# Patient Record
Sex: MALE | Race: WHITE | NOT HISPANIC OR LATINO | Employment: OTHER | URBAN - METROPOLITAN AREA
[De-identification: names, ages, dates, MRNs, and addresses within clinical notes are randomized per-mention and may not be internally consistent; named-entity substitution may affect disease eponyms.]

---

## 2017-12-04 ENCOUNTER — ALLSCRIPTS OFFICE VISIT (OUTPATIENT)
Dept: OTHER | Facility: OTHER | Age: 82
End: 2017-12-04

## 2017-12-04 LAB
BILIRUB UR QL STRIP: NEGATIVE
CLARITY UR: NORMAL
COLOR UR: YELLOW
GLUCOSE (HISTORICAL): NEGATIVE
HGB UR QL STRIP.AUTO: NEGATIVE
KETONES UR STRIP-MCNC: NEGATIVE MG/DL
LEUKOCYTE ESTERASE UR QL STRIP: NORMAL
NITRITE UR QL STRIP: NEGATIVE
PH UR STRIP.AUTO: 6 [PH]
PROT UR STRIP-MCNC: NORMAL MG/DL
SP GR UR STRIP.AUTO: 1.01
UROBILINOGEN UR QL STRIP.AUTO: 0.2

## 2018-01-16 ENCOUNTER — GENERIC CONVERSION - ENCOUNTER (OUTPATIENT)
Dept: OTHER | Facility: OTHER | Age: 83
End: 2018-01-16

## 2018-01-23 VITALS
HEIGHT: 64 IN | WEIGHT: 153 LBS | BODY MASS INDEX: 26.12 KG/M2 | SYSTOLIC BLOOD PRESSURE: 138 MMHG | DIASTOLIC BLOOD PRESSURE: 86 MMHG

## 2018-01-23 NOTE — MISCELLANEOUS
Message     Recorded as Task   Date: 01/15/2018 02:00 PM, Created By: Dior Vora   Task Name: Call Back   Assigned To: James SHAH,TEAM   Regarding Patient: Kris Tobin, Status: In Progress   Comment:    Reyes,Jennifer - [address] 2018 2:00 PM     TASK CREATED  Pateint called was seen by Dr Linda Fried [phone] wanted to know if Dr Sabina Vora wanted to see him as well  If you have any questions call lore Velazquez [phone] 2018 2:15 PM     TASK EDITED  LMOM FOR PT TO CALL  NEED TO ASSESS IF PT NEEDS APPT WITH Amber Keith - [address] 2018 2:15 PM     TASK IN PROGRESS   Mandy Garcia - 16 Jan 2018 2:12 PM     TASK EDITED  LMOM TO GIVE A CALL BACK, NEED MORE INFO ON WHY PT  WENT TO SEE Mandy Hodges - 16 Jan 2018 3:49 PM     TASK EDITED  SPOKE WITH PT  HE JUST WANTS TO SEND A FYI THAT HE WAS IN [facility] [place] [address] WITH LED POISONING  HE WILL KEEP HIS FU APPT  Current Meds   1  Aggrenox  MG Oral Capsule Extended Release 12 Hour (Aspirin-Dipyridamole   ER); Therapy: (Recorded:30Nov2017) to Recorded   2  BenzaClin 1-5 % External Gel (Clindamycin Phos-Benzoyl Perox); Therapy: (Recorded:30Nov2017) to Recorded   3  Diovan 160 MG Oral Tablet (Valsartan); Therapy: (Recorded:30Nov2017) to Recorded   4  Lutein 10 MG Oral Tablet; Therapy: (Recorded:30Nov2017) to Recorded   5  Tamsulosin HCl - 0 4 MG Oral Capsule; TAKE 1 CAPSULE Bedtime; Therapy: [id number] to (Evaluate:[id number])  Requested for: 40Cgt0685; Last   Rx:[id number] Ordered    Allergies    1  Penicillins   2  Tetracyclines   3  Versed SOLN   4  Zithromax TABS    5  No Known Environmental Allergies   6   No Known Food Allergies    Signatures   Electronically signed by : Carmine Gastelum, ; Jan 16 2018  3:50PM EST                       (Author)

## 2018-04-27 DIAGNOSIS — N40.1 BPH WITH OBSTRUCTION/LOWER URINARY TRACT SYMPTOMS: Primary | ICD-10-CM

## 2018-04-27 DIAGNOSIS — N13.8 BPH WITH OBSTRUCTION/LOWER URINARY TRACT SYMPTOMS: Primary | ICD-10-CM

## 2018-04-27 RX ORDER — TAMSULOSIN HYDROCHLORIDE 0.4 MG/1
CAPSULE ORAL
Qty: 90 CAPSULE | Refills: 1 | Status: SHIPPED | OUTPATIENT
Start: 2018-04-27 | End: 2019-01-24

## 2018-12-04 ENCOUNTER — PROCEDURE VISIT (OUTPATIENT)
Dept: UROLOGY | Facility: MEDICAL CENTER | Age: 83
End: 2018-12-04
Payer: COMMERCIAL

## 2018-12-04 VITALS
WEIGHT: 159 LBS | BODY MASS INDEX: 29.26 KG/M2 | HEART RATE: 85 BPM | DIASTOLIC BLOOD PRESSURE: 78 MMHG | SYSTOLIC BLOOD PRESSURE: 142 MMHG | HEIGHT: 62 IN

## 2018-12-04 DIAGNOSIS — N40.0 BPH WITHOUT OBSTRUCTION/LOWER URINARY TRACT SYMPTOMS: ICD-10-CM

## 2018-12-04 DIAGNOSIS — Z85.51 HISTORY OF BLADDER CANCER: Primary | ICD-10-CM

## 2018-12-04 PROCEDURE — 52000 CYSTOURETHROSCOPY: CPT | Performed by: UROLOGY

## 2018-12-04 PROCEDURE — 99213 OFFICE O/P EST LOW 20 MIN: CPT | Performed by: UROLOGY

## 2018-12-04 NOTE — PROGRESS NOTES
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  That was his only tumor  He has had no signs or symptoms of recurrent neoplasm  Procedures  MALE FLEXIBLE CYSTOSCOPY    Preoperative diagnosis:   History of bladder cancer    Postoperative diagnosis:   No visible recurrence    Operation:  Flexible cystoscopy    Surgeon:  Manoj Gonzalez MD,FACS    Anesthesia:  Xylocaine jelly    Procedure:  Patient was brought to the cystoscopy room and positively identified  The patient was prepped and draped in sterile fashion  Ten mL of 1% xylocaine jelly was instilled into the urethra  A penile clamp was applied  The flexible cystoscope was inserted  Findings are as follows:    Penile Urethra:normal  Bulbar Urethra:normal  Prostate:   Lateral lobe hyperplasia for approximately 2 cm  Bladder: The bladder neck is normal  Ureteral orifices are in normal  position  The mucosa is   Normal    There is  marked trabeculation  The cystoscope was removed  The patient tolerated the procedure well  Following additional consultation to review the findings with the patient, he was discharged from the office in satisfactory condition  Impression:  No recurrent bladder cancer  BPH without symptoms  BPH:   He notes nocturia x 2  He denies other significant urinary symptoms  He denies gross hematuria, urinary tract infections or incontinence    He is taking neither medications nor supplements for his symptoms  AUA score was 10 last year, now down to 2  PSA's are no longer monitored due to the patient's age  AUA SYMPTOM SCORE      Most Recent Value   AUA SYMPTOM SCORE   How often have you had a sensation of not emptying your bladder completely after you finished urinating? 0   How often have you had to urinate again less than two hours after you finished urinating? 0   How often have you found you stopped and started again several times when you urinate?  0   How often have you found it difficult to postpone urination? 0   How often have you had a weak urinary stream?  0   How often have you had to push or strain to begin urination? 0   How many times did you most typically get up to urinate from the time you went to bed at night until the time you got up in the morning? 2   Quality of Life: If you were to spend the rest of your life with your urinary condition just the way it is now, how would you feel about that?  1   AUA SYMPTOM SCORE  2          The following portions of the patient's history were reviewed and updated as appropriate: allergies, current medications, past family history, past medical history, past social history, past surgical history and problem list     Review of Systems   Constitutional: Negative for activity change and fatigue  Respiratory: Negative for shortness of breath and wheezing  Cardiovascular: Negative for chest pain  Hypertension  Gastrointestinal: Negative for abdominal pain  Genitourinary: Negative for difficulty urinating, dysuria, frequency, hematuria and urgency  Musculoskeletal: Negative for back pain and gait problem  Diffuse arthritis  Skin: Negative  Allergic/Immunologic: Negative  Neurological: Negative  Psychiatric/Behavioral: Negative            Objective:      /78 (BP Location: Left arm, Patient Position: Sitting, Cuff Size: Adult)   Pulse 85   Ht 5' 1 5" (1 562 m)   Wt 72 1 kg (159 lb)   BMI 29 56 kg/m²          Physical Exam   Constitutional: He is oriented to person, place, and time  He appears well-developed and well-nourished  HENT:   Head: Normocephalic and atraumatic  Eyes: EOM are normal    Neck: Normal range of motion  Neck supple  Pulmonary/Chest: Effort normal    Genitourinary: Rectum normal    Genitourinary Comments: The prostate is 40 grams, firm, smooth and non-tender   Normal external genitalia  Musculoskeletal: Normal range of motion  Neurological: He is alert and oriented to person, place, and time  Skin: Skin is warm and dry  Psychiatric: He has a normal mood and affect   His behavior is normal  Judgment and thought content normal

## 2018-12-04 NOTE — LETTER
December 4, 2018     Sweetie Toro, [address]    Patient: Joseph Nelson   YOB: 1926   Date of Visit: 12/4/2018       Dear Dr Aletha Gonzalez: Thank you for referring Verenice Areli to me for evaluation  Below are my notes for this consultation  If you have questions, please do not hesitate to call me  I look forward to following your patient along with you  Sincerely,        Darwin Gonzalez MD        CC: No Recipients  Darwin Gonzalez MD  12/4/2018 11:01 AM  Sign at close encounter  Assessment/Plan:    History of bladder cancer   No evidence of recurrence 8 years following TUR bladder tumor for superficial low-grade disease  BPH without obstruction/lower urinary tract symptoms    Minimally symptomatic  Reassess in 1 year  Diagnoses and all orders for this visit:    History of bladder cancer    BPH without obstruction/lower urinary tract symptoms          Subjective:      Patient ID: Joseph Nelson is a 80 y o  male  HPI  History of bladder cancer: The patient's last tumor was a low-grade transitional cell carcinoma resected 8 years ago  That was his only tumor  He has had no signs or symptoms of recurrent neoplasm  Procedures  MALE FLEXIBLE CYSTOSCOPY    Preoperative diagnosis:   History of bladder cancer    Postoperative diagnosis:   No visible recurrence    Operation:  Flexible cystoscopy    Surgeon:  Khang Badillo MD,FACS    Anesthesia:  Xylocaine jelly    Procedure:  Patient was brought to the cystoscopy room and positively identified  The patient was prepped and draped in sterile fashion  Ten mL of 1% xylocaine jelly was instilled into the urethra  A penile clamp was applied  The flexible cystoscope was inserted  Findings are as follows:    Penile Urethra:normal  Bulbar Urethra:normal  Prostate:   Lateral lobe hyperplasia for approximately 2 cm  Bladder:    The bladder neck is normal  Ureteral orifices are in normal  position  The mucosa is   Normal    There is  marked trabeculation  The cystoscope was removed  The patient tolerated the procedure well  Following additional consultation to review the findings with the patient, he was discharged from the office in satisfactory condition  Impression:  No recurrent bladder cancer  BPH without symptoms  BPH:   He notes nocturia x 2  He denies other significant urinary symptoms  He denies gross hematuria, urinary tract infections or incontinence  He is taking neither medications nor supplements for his symptoms  AUA score was 10 last year, now down to 2  PSA's are no longer monitored due to the patient's age  AUA SYMPTOM SCORE      Most Recent Value   AUA SYMPTOM SCORE   How often have you had a sensation of not emptying your bladder completely after you finished urinating? 0   How often have you had to urinate again less than two hours after you finished urinating? 0   How often have you found you stopped and started again several times when you urinate?  0   How often have you found it difficult to postpone urination? 0   How often have you had a weak urinary stream?  0   How often have you had to push or strain to begin urination? 0   How many times did you most typically get up to urinate from the time you went to bed at night until the time you got up in the morning? 2   Quality of Life: If you were to spend the rest of your life with your urinary condition just the way it is now, how would you feel about that?  1   AUA SYMPTOM SCORE  2          The following portions of the patient's history were reviewed and updated as appropriate: allergies, current medications, past family history, past medical history, past social history, past surgical history and problem list     Review of Systems   Constitutional: Negative for activity change and fatigue  Respiratory: Negative for shortness of breath and wheezing      Cardiovascular: Negative for chest pain  Hypertension  Gastrointestinal: Negative for abdominal pain  Genitourinary: Negative for difficulty urinating, dysuria, frequency, hematuria and urgency  Musculoskeletal: Negative for back pain and gait problem  Diffuse arthritis  Skin: Negative  Allergic/Immunologic: Negative  Neurological: Negative  Psychiatric/Behavioral: Negative  Objective:      /78 (BP Location: Left arm, Patient Position: Sitting, Cuff Size: Adult)   Pulse 85   Ht 5' 1 5" (1 562 m)   Wt 72 1 kg (159 lb)   BMI 29 56 kg/m²           Physical Exam   Constitutional: He is oriented to person, place, and time  He appears well-developed and well-nourished  HENT:   Head: Normocephalic and atraumatic  Eyes: EOM are normal    Neck: Normal range of motion  Neck supple  Pulmonary/Chest: Effort normal    Genitourinary: Rectum normal    Genitourinary Comments: The prostate is 40 grams, firm, smooth and non-tender   Normal external genitalia  Musculoskeletal: Normal range of motion  Neurological: He is alert and oriented to person, place, and time  Skin: Skin is warm and dry  Psychiatric: He has a normal mood and affect   His behavior is normal  Judgment and thought content normal

## 2019-01-23 RX ORDER — METOPROLOL SUCCINATE 25 MG/1
25 TABLET, EXTENDED RELEASE ORAL
COMMUNITY
Start: 2019-01-05

## 2019-01-23 RX ORDER — SERTRALINE HYDROCHLORIDE 25 MG/1
25 TABLET, FILM COATED ORAL
COMMUNITY
Start: 2019-01-15 | End: 2019-04-15

## 2019-01-23 RX ORDER — VALSARTAN 80 MG/1
80 TABLET ORAL
COMMUNITY
Start: 2019-01-05

## 2019-01-24 ENCOUNTER — OFFICE VISIT (OUTPATIENT)
Dept: UROLOGY | Facility: MEDICAL CENTER | Age: 84
End: 2019-01-24
Payer: COMMERCIAL

## 2019-01-24 VITALS
SYSTOLIC BLOOD PRESSURE: 122 MMHG | DIASTOLIC BLOOD PRESSURE: 68 MMHG | WEIGHT: 151 LBS | HEIGHT: 65 IN | BODY MASS INDEX: 25.16 KG/M2 | HEART RATE: 70 BPM

## 2019-01-24 DIAGNOSIS — N30.00 ACUTE CYSTITIS WITHOUT HEMATURIA: Primary | ICD-10-CM

## 2019-01-24 DIAGNOSIS — N13.8 BPH WITH OBSTRUCTION/LOWER URINARY TRACT SYMPTOMS: ICD-10-CM

## 2019-01-24 DIAGNOSIS — N40.1 BPH WITH OBSTRUCTION/LOWER URINARY TRACT SYMPTOMS: ICD-10-CM

## 2019-01-24 DIAGNOSIS — Z71.89 OTHER SPECIFIED COUNSELING: ICD-10-CM

## 2019-01-24 PROCEDURE — 99214 OFFICE O/P EST MOD 30 MIN: CPT | Performed by: UROLOGY

## 2019-01-24 RX ORDER — TAMSULOSIN HYDROCHLORIDE 0.4 MG/1
0.4 CAPSULE ORAL EVERY EVENING
Qty: 90 CAPSULE | Refills: 3 | Status: SHIPPED | OUTPATIENT
Start: 2019-01-24

## 2019-01-24 NOTE — PROGRESS NOTES
Assessment/Plan:    BPH with obstruction/lower urinary tract symptoms  Tamsulosin renewed for 1 year  Acute cystitis without hematuria  The patient has no outward signs of urinary infection or sepsis  A urine culture will be obtained  Diagnoses and all orders for this visit:    Acute cystitis without hematuria  -     Urine culture; Future    BPH with obstruction/lower urinary tract symptoms  -     tamsulosin (FLOMAX) 0 4 mg; Take 1 capsule (0 4 mg total) by mouth every evening    Other specified counseling    Other orders  -     cyanocobalamin 100 MCG tablet; Take 1,000 mcg by mouth  -     metoprolol succinate (TOPROL-XL) 25 mg 24 hr tablet; Take 25 mg by mouth  -     sertraline (ZOLOFT) 25 mg tablet; Take 25 mg by mouth  -     valsartan (DIOVAN) 80 mg tablet; Take 80 mg by mouth          Subjective:      Patient ID: Adolfo Mata is a 80 y o  male  HPI  Urinary tract infection with sepsis:  The patient underwent a surveillance cystoscopy for bladder cancer on December 4, 2018  Urinalysis in the office prior his cystoscopy was normal   Within 24 hr, he was admitted to [facility] with urinary sepsis due to Klebsiella oxytocin and enterococcus faecalis  Both organisms were recovered from blood  He was treated with antibiotics appropriately  He was discharged to rehab to get his strength back  Eventually, he went  he developed symptoms of urinary sepsis once again on January 1, 2019  A urine culture then grew Klebsiella oxytocin of once again  Blood cultures were negative  A noncontrast CT-stone study was negative  The patient returns now for re-evaluation  Currently, he is asymptomatic  He was unable to provide a urine sample for urinalysis  We will obtain a urine culture as an outpatient    A CBC obtained on January 2, 2019 was normal   Comprehensive metabolic profile was normal except for mild decrease in the albumin at 3 2 (normal 3 5-4 8), mild elevation of the total bilirubin 1 1 (normal 0 2-1 0) and a calculated GFR is 50 2 mL/min  The patient is accompanied by his wife who provides most of the history  The following portions of the patient's history were reviewed and updated as appropriate: allergies, current medications, past family history, past medical history, past social history, past surgical history and problem list     Review of Systems    Constitutional: Negative for activity change and fatigue  Respiratory: Negative for shortness of breath and wheezing  Cardiovascular: Negative for chest pain  Hypertension  Gastrointestinal: Negative for abdominal pain  Genitourinary: Negative for difficulty urinating, dysuria, frequency, hematuria and urgency  Musculoskeletal: Negative for back pain and gait problem  Diffuse arthritis  Skin: Negative  Allergic/Immunologic: Negative  Neurological:  Mild confusion,? Dementia  Psychiatric/Behavioral: Negative  Objective:      /68 (BP Location: Left arm, Patient Position: Sitting)   Pulse 70   Ht 5' 5" (1 651 m)   Wt 68 5 kg (151 lb)   BMI 25 13 kg/m²          Physical Exam   Constitutional: He is oriented to person, place, and time  He appears well-developed and well-nourished  HENT:   Head: Normocephalic and atraumatic  Eyes: EOM are normal    Neck: Normal range of motion  Pulmonary/Chest: Effort normal    Musculoskeletal: Normal range of motion  Neurological: He is alert and oriented to person, place, and time  Skin: Skin is warm and dry  Psychiatric: He has a normal mood and affect   His behavior is normal  Judgment and thought content normal

## 2019-01-24 NOTE — LETTER
January 24, 2019     Kyler Garza, [address]    Patient: Rodney Nagy   YOB: 1926   Date of Visit: 1/24/2019       Dear Dr Jumana Navarrete: Thank you for referring Tiffani Moon to me for evaluation  Below are my notes for this consultation  If you have questions, please do not hesitate to call me  I look forward to following your patient along with you  Sincerely,        Angel Austin MD        CC: No Recipients  Angel Austin MD  1/24/2019 12:08 PM  Sign at close encounter  Assessment/Plan:    BPH with obstruction/lower urinary tract symptoms  Tamsulosin renewed for 1 year  Acute cystitis without hematuria  The patient has no outward signs of urinary infection or sepsis  A urine culture will be obtained  Diagnoses and all orders for this visit:    Acute cystitis without hematuria  -     Urine culture; Future    BPH with obstruction/lower urinary tract symptoms  -     tamsulosin (FLOMAX) 0 4 mg; Take 1 capsule (0 4 mg total) by mouth every evening    Other specified counseling    Other orders  -     cyanocobalamin 100 MCG tablet; Take 1,000 mcg by mouth  -     metoprolol succinate (TOPROL-XL) 25 mg 24 hr tablet; Take 25 mg by mouth  -     sertraline (ZOLOFT) 25 mg tablet; Take 25 mg by mouth  -     valsartan (DIOVAN) 80 mg tablet; Take 80 mg by mouth          Subjective:      Patient ID: Rodney Nagy is a 80 y o  male  HPI  Urinary tract infection with sepsis:  The patient underwent a surveillance cystoscopy for bladder cancer on December 4, 2018  Urinalysis in the office prior his cystoscopy was normal   Within 24 hr, he was admitted to [facility] with urinary sepsis due to Klebsiella oxytocin and enterococcus faecalis  Both organisms were recovered from blood  He was treated with antibiotics appropriately  He was discharged to rehab to get his strength back    Eventually, he went  he developed symptoms of urinary sepsis once again on January 1, 2019  A urine culture then grew Klebsiella oxytocin of once again  Blood cultures were negative  A noncontrast CT-stone study was negative  The patient returns now for re-evaluation  Currently, he is asymptomatic  He was unable to provide a urine sample for urinalysis  We will obtain a urine culture as an outpatient  A CBC obtained on January 2, 2019 was normal   Comprehensive metabolic profile was normal except for mild decrease in the albumin at 3 2 (normal 3 5-4 8), mild elevation of the total bilirubin 1 1 (normal 0 2-1 0) and a calculated GFR is 50 2 mL/min  The patient is accompanied by his wife who provides most of the history  The following portions of the patient's history were reviewed and updated as appropriate: allergies, current medications, past family history, past medical history, past social history, past surgical history and problem list     Review of Systems    Constitutional: Negative for activity change and fatigue  Respiratory: Negative for shortness of breath and wheezing  Cardiovascular: Negative for chest pain  Hypertension  Gastrointestinal: Negative for abdominal pain  Genitourinary: Negative for difficulty urinating, dysuria, frequency, hematuria and urgency  Musculoskeletal: Negative for back pain and gait problem  Diffuse arthritis  Skin: Negative  Allergic/Immunologic: Negative  Neurological:  Mild confusion,? Dementia  Psychiatric/Behavioral: Negative  Objective:      /68 (BP Location: Left arm, Patient Position: Sitting)   Pulse 70   Ht 5' 5" (1 651 m)   Wt 68 5 kg (151 lb)   BMI 25 13 kg/m²           Physical Exam   Constitutional: He is oriented to person, place, and time  He appears well-developed and well-nourished  HENT:   Head: Normocephalic and atraumatic  Eyes: EOM are normal    Neck: Normal range of motion     Pulmonary/Chest: Effort normal    Musculoskeletal: Normal range of motion  Neurological: He is alert and oriented to person, place, and time  Skin: Skin is warm and dry  Psychiatric: He has a normal mood and affect   His behavior is normal  Judgment and thought content normal

## 2019-01-28 ENCOUNTER — TELEPHONE (OUTPATIENT)
Dept: UROLOGY | Facility: MEDICAL CENTER | Age: 84
End: 2019-01-28

## 2019-01-28 NOTE — TELEPHONE ENCOUNTER
----- Message from Ricardo Guajardo MD sent at 1/28/2019  8:07 AM EST -----  Urine culture indicates the patient is not infected  He may discontinue his antibiotics  Please inform the patient  Thank you

## 2019-02-01 ENCOUNTER — TELEPHONE (OUTPATIENT)
Dept: UROLOGY | Facility: MEDICAL CENTER | Age: 84
End: 2019-02-01

## 2019-02-01 NOTE — TELEPHONE ENCOUNTER
Please explained to nursing facility that it is okay for the patient to take tamsulosin during the morning with the rest of his medications  Please continue to monitor patient for orthostatic hypotension and dizziness as these can be a side effect of the medication; which is the reason why we typically prescribed them to be taken at night  If he does not experience any ill side effects, taking it in the a m  Is perfectly acceptable

## 2019-02-01 NOTE — TELEPHONE ENCOUNTER
[facility] called questioning if pt can take medication Tamsulosin in the morning with other meds as he forgets to in the evening

## 2019-02-01 NOTE — TELEPHONE ENCOUNTER
Informed Pham on [employer]  The facility will make sure that they check BP to make sure it doesn't drop too low and pt does not get dizzy

## 2020-10-07 NOTE — MISCELLANEOUS
Message     Recorded as Task   Date: 01/15/2018 02:00 PM, Created By: Meghann Lee   Task Name: Call Back   Assigned To: James SHAH,TEAM   Regarding Patient: Shey Burnham, Status: In Progress   Comment:    Reyes,Jennifer - [address] 2018 2:00 PM     TASK CREATED  Pateint called was seen by Dr Lorie Jack [phone] wanted to know if Dr Miguelina Menchaca wanted to see him as well  If you have any questions call lore Swannternelia [phone] 2018 2:15 PM     TASK EDITED  LMOM FOR PT TO CALL  NEED TO ASSESS IF PT NEEDS APPT WITH Amber Walton - [address] 2018 2:15 PM     TASK IN PROGRESS   Mandy Garcia - 16 Jan 2018 2:12 PM     TASK EDITED  LMOM TO GIVE A CALL BACK, WE NEED MORE INFO ON WHY PT  WENT TO SEE DR SOTO, AND WHY HE WOULD NEED TO COME BACK TO SEE US  Current Meds   1  Aggrenox  MG Oral Capsule Extended Release 12 Hour (Aspirin-Dipyridamole   ER); Therapy: (Recorded:30Nov2017) to Recorded   2  BenzaClin 1-5 % External Gel (Clindamycin Phos-Benzoyl Perox); Therapy: (Recorded:30Nov2017) to Recorded   3  Diovan 160 MG Oral Tablet (Valsartan); Therapy: (Recorded:30Nov2017) to Recorded   4  Lutein 10 MG Oral Tablet; Therapy: (Recorded:30Nov2017) to Recorded   5  Tamsulosin HCl - 0 4 MG Oral Capsule; TAKE 1 CAPSULE Bedtime; Therapy: [id number] to (Evaluate:[id number])  Requested for: [id number]; Last   Rx:[id number] Ordered    Allergies    1  Penicillins   2  Tetracyclines   3  Versed SOLN   4  Zithromax TABS    5  No Known Environmental Allergies   6   No Known Food Allergies    Signatures   Electronically signed by : Lucia Lemus, ; Jan 16 2018  2:13PM EST                       (Author) What Type Of Note Output Would You Prefer (Optional)?: Standard Output How Severe Is Your Skin Lesion?: mild Has Your Skin Lesion Been Treated?: not been treated Is This A New Presentation, Or A Follow-Up?: Skin Lesion